# Patient Record
(demographics unavailable — no encounter records)

---

## 2025-01-17 NOTE — PROCEDURE
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 The patient is referred by pediatrician for correction of ear deformity using infant ear molding. There is a constricted and cupped ear deformity of the left and right ears that are amenable to ear molding.   Procedure Note: After completion of feeding, the baby was swaddled and laid on the left side. A silicone impression was taken using putty. The ear was measured for size and an appropriate base was fashioned from a large cradle. A medium retainer was bent and fabricated to the appropriate size to prevent encroachment on the retroauricular sulcus and there was adequate dimension within the cradle for the full dimension of the pinna. Hair was then shaved to leave approximately a one quarter of an inch boundary beyond the adhesive footplate of the posterior cradle. Skin prep was next done with alcohol pads to remove any residual skin oil. The posterior cradle was then slipped over the ear and the posterior conformer aligned precisely with the desired position of the superior limb of the triangular fossa. Care was taken to leave approximately a 1 mm space between the posterior conformer and the retroauricular sulcus. The pinna was then displaced back into the cradle to ensure that the superior limb of the triangular fossa was in perfect alignment with the anti-helical fold. Next the adhesive liners of the posterior cradle were removed allowing the cradle to be secured to the scalp. In addition it was necessary to bend the retractor so as to conform to the ideal shape of the helical rim. The retractor was directly positioned over the abnormal shape of the helical rim. With these adjustments made the internal adhesive liners were removed and the retractor affixed to the inner surface of the cradle. The baby was then placed on the opposite side and the contralateral identical procedure was performed.

## 2025-01-17 NOTE — PROCEDURE
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 The patient is referred by pediatrician for correction of ear deformity using infant ear molding. There is a constricted and cupped ear deformity of the left and right ears that are amenable to ear molding.   Procedure Note: After completion of feeding, the baby was swaddled and laid on the left side. A silicone impression was taken using putty. The ear was measured for size and an appropriate base was fashioned from a large cradle. A medium retainer was bent and fabricated to the appropriate size to prevent encroachment on the retroauricular sulcus and there was adequate dimension within the cradle for the full dimension of the pinna. Hair was then shaved to leave approximately a one quarter of an inch boundary beyond the adhesive footplate of the posterior cradle. Skin prep was next done with alcohol pads to remove any residual skin oil. The posterior cradle was then slipped over the ear and the posterior conformer aligned precisely with the desired position of the superior limb of the triangular fossa. Care was taken to leave approximately a 1 mm space between the posterior conformer and the retroauricular sulcus. The pinna was then displaced back into the cradle to ensure that the superior limb of the triangular fossa was in perfect alignment with the anti-helical fold. Next the adhesive liners of the posterior cradle were removed allowing the cradle to be secured to the scalp. In addition it was necessary to bend the retractor so as to conform to the ideal shape of the helical rim. The retractor was directly positioned over the abnormal shape of the helical rim. With these adjustments made the internal adhesive liners were removed and the retractor affixed to the inner surface of the cradle. The baby was then placed on the opposite side and the contralateral identical procedure was performed.

## 2025-01-17 NOTE — ASSESSMENT
[FreeTextEntry1] : Gen: NAD: alert; age appropriateEyes: EOM intact; no erythema/injection of sclera Resp: Even, unlabored. No increased WOB CV: Warm, pink, well perfused Neuro: Normal tone. CN VI intact Abd: soft, ND; NT Skin: No rashes/lesions noted HENT: Cupped ears bilaterally and helical rims with severe constriction. Upturn of bilateral lobules. EAC normal. Jaw midline. NCAT. AFOF, soft. No suture ridging.  A/P; Congenital ear deformity start ear molding; left and right Written instruction for care given and reviewed with parents/patient. risks of failure to correct deformity, relapse and ulceration discussed RTC 2 weeks

## 2025-01-17 NOTE — HISTORY OF PRESENT ILLNESS
[FreeTextEntry1] : 28 day old baby who presents today for ear molding for congenital ear deformity noted at birth and not improving Pt was seen by pediatrician (Dr. Asher) and referred for further evaluation and treatment. Pt was born at 40 weeks. There is no family history of ear deformity. Mom denies complications with pregnancy and delivery there is no significant past medical or surgical history Parent reports normal feeding and elimination patterns and normal infant development. Age appropriate milestones and behavior. Infant hearing screen passed. Appropriate weight gain.

## 2025-01-31 NOTE — ASSESSMENT
[FreeTextEntry1] : Ear mold removed. Site cleaned. Ear skin intact and pink. No signs of cellulitis/infection or ulceration. Molds and retractors replaced and secured in place. Parent advised to maintain site clean and dry. Reinforce tape as needed. RTC if ear mold comes off before next appointment. RTC 2 weeks for recheck.